# Patient Record
Sex: MALE | Race: WHITE | NOT HISPANIC OR LATINO
[De-identification: names, ages, dates, MRNs, and addresses within clinical notes are randomized per-mention and may not be internally consistent; named-entity substitution may affect disease eponyms.]

---

## 2019-10-09 PROBLEM — Z00.00 ENCOUNTER FOR PREVENTIVE HEALTH EXAMINATION: Status: ACTIVE | Noted: 2019-10-09

## 2019-10-11 ENCOUNTER — APPOINTMENT (OUTPATIENT)
Dept: INFECTIOUS DISEASE | Facility: CLINIC | Age: 29
End: 2019-10-11
Payer: COMMERCIAL

## 2019-10-11 VITALS
RESPIRATION RATE: 14 BRPM | BODY MASS INDEX: 30.36 KG/M2 | SYSTOLIC BLOOD PRESSURE: 142 MMHG | WEIGHT: 205 LBS | TEMPERATURE: 97.9 F | DIASTOLIC BLOOD PRESSURE: 77 MMHG | HEIGHT: 69 IN | HEART RATE: 64 BPM | OXYGEN SATURATION: 98 %

## 2019-10-11 DIAGNOSIS — Z83.3 FAMILY HISTORY OF DIABETES MELLITUS: ICD-10-CM

## 2019-10-11 DIAGNOSIS — Z87.891 PERSONAL HISTORY OF NICOTINE DEPENDENCE: ICD-10-CM

## 2019-10-11 DIAGNOSIS — B95.62 CELLULITIS, UNSPECIFIED: ICD-10-CM

## 2019-10-11 DIAGNOSIS — L03.90 CELLULITIS, UNSPECIFIED: ICD-10-CM

## 2019-10-11 PROCEDURE — 99204 OFFICE O/P NEW MOD 45 MIN: CPT | Mod: 25

## 2019-10-11 PROCEDURE — 36415 COLL VENOUS BLD VENIPUNCTURE: CPT

## 2019-10-11 RX ORDER — CHLORHEXIDINE GLUCONATE 1.2 MG/ML
0.12 RINSE BUCCAL
Qty: 1 | Refills: 1 | Status: ACTIVE | COMMUNITY
Start: 2019-10-11 | End: 1900-01-01

## 2019-10-11 RX ORDER — MUPIROCIN 2 G/100G
2 CREAM TOPICAL
Refills: 0 | Status: ACTIVE | COMMUNITY

## 2019-10-11 RX ORDER — SULFAMETHOXAZOLE AND TRIMETHOPRIM 800; 160 MG/1; MG/1
800-160 TABLET ORAL
Refills: 0 | Status: ACTIVE | COMMUNITY

## 2019-10-11 NOTE — REVIEW OF SYSTEMS
[As Noted in HPI] : as noted in HPI [Fever] : no fever [Chills] : no chills [Eye Pain] : no eye pain [Eyesight Problems] : no eyesight problems [Nasal Discharge] : no nasal discharge [Sore Throat] : no sore throat [Chest Pain] : no chest pain [Shortness Of Breath] : no shortness of breath [Cough] : no cough [Abdominal Pain] : no abdominal pain [Vomiting] : no vomiting [Diarrhea] : no diarrhea [Dysuria] : no dysuria [Joint Swelling] : no joint swelling [Joint Stiffness] : no joint stiffness [Confused] : no confusion [Easy Bleeding] : no tendency for easy bleeding [Easy Bruising] : no tendency for easy bruising [Swollen Glands] : no swollen glands

## 2019-10-11 NOTE — ASSESSMENT
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  Patient did MRSA decolonization process following August episode and did not get another outbreak until October.  Recommend he continue with MRSA decolonization monthly for the first 5 days of every month.  He has already made lifestyle changes such as washes sheets, clothes, etc.  We discussed the importance of hand and nail hygiene, avoiding autoinoculation.  No sensitivity data available at this time.  There is chance of TMP/SX resistance as he has been on multiple courses.  Culture of right hip abscess obtained after cleansing with chlorhexidine.  He has f/u scheduled for mid-November with his other ID physician. \par Plan:\par - Abscess culture and gram stain\par - Baseline labs: CBC, CMP.  Will call with results\par - Periogard mouth wash twice daily days 1-5 of each month\par - Mupirocin twice daily nares days 1-5 of each month\par - Continue Hibiclens daily\par - Educational materials provided regarding proper use of Hibiclens, Mupirocin, Periogard as well as limiting spread to others and decontaminating home\par Call with any questions or concerns. Follow up PRN.

## 2019-10-11 NOTE — HISTORY OF PRESENT ILLNESS
[FreeTextEntry1] : 29 year old male presents for recurrent MRSA abscesses. Patient first had MRSA abscess on L buttock in May 2019.  Abscess was drained locally at urgent care and he was given Bactrim x 10 days. About two weeks later (a few days after finishing course) he developed another abscess on back of right thigh.  Abscess culture grew MRSA and he was given an additional course of Bactrim.  In August his left hand became significantly swollen and he underwent OR I&D with hand surgeon.  Culture again grew MRSA and he was given Bactrim.  This episode was preceded by a horse fly bite to the site.  Following this he saw a local ID who recommended Bactrim PRN as well as decolonization process using Hibiclens and mupirocin.  Patient has been compliant with decolonization.  He washes his clothes, sheets and towels daily.  Two weeks ago his right hip became red and started swelling.  He developed fever and presented to ED.  He was given vancomycin x 1 dose, then d/germain on TMP/SX, which he is currently taking.  Abscess ruptured spontaneously.  He continues to do Hibiclens daily and does mupirocin when he has an outbreak.  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  He washes his hands frequently at work with antibacterial soap.  Patient lives with wife and son and they do not have any abscesses.

## 2019-10-14 LAB
ALBUMIN SERPL ELPH-MCNC: 4.8 G/DL
ALP BLD-CCNC: 70 U/L
ALT SERPL-CCNC: 46 U/L
ANION GAP SERPL CALC-SCNC: 14 MMOL/L
AST SERPL-CCNC: 23 U/L
BASOPHILS # BLD AUTO: 0.07 K/UL
BASOPHILS NFR BLD AUTO: 1 %
BILIRUB SERPL-MCNC: 0.2 MG/DL
BUN SERPL-MCNC: 14 MG/DL
CALCIUM SERPL-MCNC: 9.6 MG/DL
CHLORIDE SERPL-SCNC: 105 MMOL/L
CO2 SERPL-SCNC: 22 MMOL/L
CREAT SERPL-MCNC: 1.23 MG/DL
EOSINOPHIL # BLD AUTO: 0.19 K/UL
EOSINOPHIL NFR BLD AUTO: 2.8 %
GLUCOSE SERPL-MCNC: 114 MG/DL
HCT VFR BLD CALC: 49.2 %
HGB BLD-MCNC: 16.2 G/DL
IMM GRANULOCYTES NFR BLD AUTO: 0.1 %
LYMPHOCYTES # BLD AUTO: 1.48 K/UL
LYMPHOCYTES NFR BLD AUTO: 22.1 %
MAN DIFF?: NORMAL
MCHC RBC-ENTMCNC: 30.2 PG
MCHC RBC-ENTMCNC: 32.9 GM/DL
MCV RBC AUTO: 91.8 FL
MONOCYTES # BLD AUTO: 0.46 K/UL
MONOCYTES NFR BLD AUTO: 6.9 %
NEUTROPHILS # BLD AUTO: 4.48 K/UL
NEUTROPHILS NFR BLD AUTO: 67.1 %
PLATELET # BLD AUTO: 234 K/UL
POTASSIUM SERPL-SCNC: 4.3 MMOL/L
PROT SERPL-MCNC: 7.2 G/DL
RBC # BLD: 5.36 M/UL
RBC # FLD: 13.1 %
SODIUM SERPL-SCNC: 140 MMOL/L
WBC # FLD AUTO: 6.69 K/UL

## 2019-10-17 LAB — BACTERIA WND CULT: NORMAL

## 2022-04-06 NOTE — PHYSICAL EXAM
[General Appearance - Alert] : alert [General Appearance - Well-Appearing] : healthy appearing [Sclera] : the sclera and conjunctiva were normal [Outer Ear] : the ears and nose were normal in appearance [Examination Of The Oral Cavity] : the lips and gums were normal [Oropharynx] : the oropharynx was normal with no thrush [Auscultation Breath Sounds / Voice Sounds] : lungs were clear to auscultation bilaterally [Heart Rate And Rhythm] : heart rate was normal and rhythm regular [Heart Sounds] : normal S1 and S2 [Murmurs] : no murmurs show [Edema] : there was no peripheral edema [Bowel Sounds] : normal bowel sounds [Abdomen Soft] : soft [Abdomen Tenderness] : non-tender [] : no hepato-splenomegaly [Costovertebral Angle Tenderness] : no CVA tenderness [No Palpable Adenopathy] : no palpable adenopathy [Musculoskeletal - Swelling] : no joint swelling [FreeTextEntry1] : R hip ~7 cm patch of erythema with central area that bled when yellow crust was removed from bandage, mild warmth